# Patient Record
Sex: MALE | Race: WHITE | NOT HISPANIC OR LATINO | URBAN - METROPOLITAN AREA
[De-identification: names, ages, dates, MRNs, and addresses within clinical notes are randomized per-mention and may not be internally consistent; named-entity substitution may affect disease eponyms.]

---

## 2017-08-29 ENCOUNTER — NEW PATIENT (OUTPATIENT)
Dept: URBAN - METROPOLITAN AREA CLINIC 10 | Facility: CLINIC | Age: 62
End: 2017-08-29
Payer: COMMERCIAL

## 2017-08-29 DIAGNOSIS — Z79.84 LONG TERM (CURRENT) USE OF ORAL ANTIDIABETIC DRUGS: ICD-10-CM

## 2017-08-29 DIAGNOSIS — H25.13 AGE-RELATED NUCLEAR CATARACT, BILATERAL: ICD-10-CM

## 2017-08-29 PROCEDURE — 92004 COMPRE OPH EXAM NEW PT 1/>: CPT | Performed by: OPHTHALMOLOGY

## 2017-08-29 PROCEDURE — 92134 CPTRZ OPH DX IMG PST SGM RTA: CPT | Performed by: OPHTHALMOLOGY

## 2017-08-29 PROCEDURE — 92235 FLUORESCEIN ANGRPH MLTIFRAME: CPT | Performed by: OPHTHALMOLOGY

## 2017-08-29 RX ORDER — OFLOXACIN 3 MG/ML
0.3 % SOLUTION/ DROPS OPHTHALMIC
Qty: 1 | Refills: 0 | Status: INACTIVE
Start: 2017-08-29 | End: 2017-09-04

## 2017-08-29 RX ORDER — PREDNISOLONE ACETATE 10 MG/ML
1 % SUSPENSION/ DROPS OPHTHALMIC
Qty: 1 | Refills: 1 | Status: INACTIVE
Start: 2017-08-29 | End: 2018-01-10

## 2017-08-29 ASSESSMENT — INTRAOCULAR PRESSURE
OD: 10
OS: 10

## 2017-09-13 ENCOUNTER — Encounter (OUTPATIENT)
Dept: URBAN - METROPOLITAN AREA CLINIC 10 | Facility: CLINIC | Age: 62
End: 2017-09-13
Payer: COMMERCIAL

## 2017-09-13 PROCEDURE — 99213 OFFICE O/P EST LOW 20 MIN: CPT | Performed by: PHYSICIAN ASSISTANT

## 2017-09-13 RX ORDER — METFORMIN HYDROCHLORIDE 500 MG/1
500 MG TABLET, FILM COATED ORAL
Qty: 0 | Refills: 0 | Status: INACTIVE
Start: 2017-09-13 | End: 2017-12-05

## 2017-09-20 ENCOUNTER — SURGERY (OUTPATIENT)
Dept: URBAN - METROPOLITAN AREA SURGERY 5 | Facility: SURGERY | Age: 62
End: 2017-09-20
Payer: COMMERCIAL

## 2017-09-20 PROCEDURE — 67113 REPAIR RETINAL DETACH CPLX: CPT | Performed by: OPHTHALMOLOGY

## 2017-09-21 ENCOUNTER — POST OP (OUTPATIENT)
Dept: URBAN - METROPOLITAN AREA CLINIC 10 | Facility: CLINIC | Age: 62
End: 2017-09-21

## 2017-09-21 PROCEDURE — 99024 POSTOP FOLLOW-UP VISIT: CPT | Performed by: OPTOMETRIST

## 2017-09-21 ASSESSMENT — INTRAOCULAR PRESSURE: OD: 10

## 2017-10-03 ENCOUNTER — FOLLOW UP ESTABLISHED (OUTPATIENT)
Dept: URBAN - METROPOLITAN AREA CLINIC 10 | Facility: CLINIC | Age: 62
End: 2017-10-03
Payer: COMMERCIAL

## 2017-10-03 DIAGNOSIS — E11.3552 TYPE 2 DIABETES WITH STABLE PROLIF DIABETIC RTNOP, LEFT EYE: Primary | ICD-10-CM

## 2017-10-03 PROCEDURE — 92134 CPTRZ OPH DX IMG PST SGM RTA: CPT | Performed by: OPHTHALMOLOGY

## 2017-10-03 PROCEDURE — 67028 INJECTION EYE DRUG: CPT | Performed by: OPHTHALMOLOGY

## 2017-10-03 PROCEDURE — 99024 POSTOP FOLLOW-UP VISIT: CPT | Performed by: OPHTHALMOLOGY

## 2017-10-03 ASSESSMENT — INTRAOCULAR PRESSURE
OS: 10
OD: 10

## 2017-11-03 ENCOUNTER — FOLLOW UP ESTABLISHED (OUTPATIENT)
Dept: URBAN - METROPOLITAN AREA CLINIC 10 | Facility: CLINIC | Age: 62
End: 2017-11-03
Payer: COMMERCIAL

## 2017-11-03 PROCEDURE — 67028 INJECTION EYE DRUG: CPT | Performed by: OPHTHALMOLOGY

## 2017-11-03 PROCEDURE — 99024 POSTOP FOLLOW-UP VISIT: CPT | Performed by: OPHTHALMOLOGY

## 2017-11-03 PROCEDURE — 92134 CPTRZ OPH DX IMG PST SGM RTA: CPT | Performed by: OPHTHALMOLOGY

## 2017-11-03 ASSESSMENT — INTRAOCULAR PRESSURE
OS: 12
OD: 10

## 2017-12-05 ENCOUNTER — FOLLOW UP ESTABLISHED (OUTPATIENT)
Dept: URBAN - METROPOLITAN AREA CLINIC 10 | Facility: CLINIC | Age: 62
End: 2017-12-05
Payer: COMMERCIAL

## 2017-12-05 PROCEDURE — 92014 COMPRE OPH EXAM EST PT 1/>: CPT | Performed by: OPHTHALMOLOGY

## 2017-12-05 PROCEDURE — 67028 INJECTION EYE DRUG: CPT | Performed by: OPHTHALMOLOGY

## 2017-12-05 PROCEDURE — 92134 CPTRZ OPH DX IMG PST SGM RTA: CPT | Performed by: OPHTHALMOLOGY

## 2017-12-05 RX ORDER — METFORMIN HYDROCHLORIDE 500 MG/1
500 MG TABLET, FILM COATED ORAL
Qty: 0 | Refills: 0 | Status: INACTIVE
Start: 2017-12-05 | End: 2018-01-10

## 2017-12-05 ASSESSMENT — INTRAOCULAR PRESSURE
OS: 8
OD: 8

## 2018-01-02 ENCOUNTER — FOLLOW UP ESTABLISHED (OUTPATIENT)
Dept: URBAN - METROPOLITAN AREA CLINIC 10 | Facility: CLINIC | Age: 63
End: 2018-01-02
Payer: COMMERCIAL

## 2018-01-02 PROCEDURE — 92134 CPTRZ OPH DX IMG PST SGM RTA: CPT | Performed by: OPHTHALMOLOGY

## 2018-01-02 PROCEDURE — 67028 INJECTION EYE DRUG: CPT | Performed by: OPHTHALMOLOGY

## 2018-01-02 ASSESSMENT — INTRAOCULAR PRESSURE
OD: 9
OS: 9

## 2018-01-10 ENCOUNTER — FOLLOW UP ESTABLISHED (OUTPATIENT)
Dept: URBAN - METROPOLITAN AREA CLINIC 10 | Facility: CLINIC | Age: 63
End: 2018-01-10
Payer: COMMERCIAL

## 2018-01-10 PROCEDURE — 92134 CPTRZ OPH DX IMG PST SGM RTA: CPT | Performed by: OPTOMETRIST

## 2018-01-10 PROCEDURE — 92014 COMPRE OPH EXAM EST PT 1/>: CPT | Performed by: OPTOMETRIST

## 2018-01-10 ASSESSMENT — VISUAL ACUITY
OS: 20/40
OD: 20/60

## 2018-01-10 ASSESSMENT — INTRAOCULAR PRESSURE
OD: 8
OS: 9

## 2018-01-10 ASSESSMENT — KERATOMETRY
OD: 46.00
OS: 46.38

## 2018-01-22 ENCOUNTER — FOLLOW UP ESTABLISHED (OUTPATIENT)
Dept: URBAN - METROPOLITAN AREA CLINIC 10 | Facility: CLINIC | Age: 63
End: 2018-01-22
Payer: COMMERCIAL

## 2018-01-22 PROCEDURE — 92012 INTRM OPH EXAM EST PATIENT: CPT | Performed by: OPTOMETRIST

## 2018-01-22 ASSESSMENT — INTRAOCULAR PRESSURE
OD: 11
OS: 10

## 2018-01-23 ENCOUNTER — Encounter (OUTPATIENT)
Dept: URBAN - METROPOLITAN AREA CLINIC 10 | Facility: CLINIC | Age: 63
End: 2018-01-23
Payer: COMMERCIAL

## 2018-01-23 DIAGNOSIS — H25.813 COMBINED FORMS OF AGE-RELATED CATARACT, BILATERAL: Primary | ICD-10-CM

## 2018-01-23 PROCEDURE — 92025 CPTRIZED CORNEAL TOPOGRAPHY: CPT | Performed by: OPHTHALMOLOGY

## 2018-01-23 PROCEDURE — 99213 OFFICE O/P EST LOW 20 MIN: CPT | Performed by: PHYSICIAN ASSISTANT

## 2018-01-25 ENCOUNTER — FOLLOW UP ESTABLISHED (OUTPATIENT)
Dept: URBAN - METROPOLITAN AREA CLINIC 10 | Facility: CLINIC | Age: 63
End: 2018-01-25
Payer: COMMERCIAL

## 2018-01-25 DIAGNOSIS — H25.12 AGE-RELATED NUCLEAR CATARACT, LEFT EYE: ICD-10-CM

## 2018-01-25 DIAGNOSIS — H53.2 DIPLOPIA: ICD-10-CM

## 2018-01-25 DIAGNOSIS — H25.11 AGE-RELATED NUCLEAR CATARACT, RIGHT EYE: Primary | ICD-10-CM

## 2018-01-25 PROCEDURE — 92012 INTRM OPH EXAM EST PATIENT: CPT | Performed by: OPHTHALMOLOGY

## 2018-01-25 RX ORDER — PREDNISOLONE ACETATE 10 MG/ML
1 % SUSPENSION/ DROPS OPHTHALMIC
Qty: 1 | Refills: 1 | Status: INACTIVE
Start: 2018-01-25 | End: 2018-03-07

## 2018-01-25 RX ORDER — DICLOFENAC SODIUM 1 MG/ML
0.1 % SOLUTION/ DROPS OPHTHALMIC
Qty: 1 | Refills: 1 | Status: INACTIVE
Start: 2018-01-25 | End: 2018-03-07

## 2018-01-25 ASSESSMENT — INTRAOCULAR PRESSURE
OD: 12
OS: 12

## 2018-01-30 ENCOUNTER — SURGERY (OUTPATIENT)
Dept: URBAN - METROPOLITAN AREA SURGERY 5 | Facility: SURGERY | Age: 63
End: 2018-01-30
Payer: COMMERCIAL

## 2018-01-30 PROCEDURE — 66984 XCAPSL CTRC RMVL W/O ECP: CPT | Performed by: OPHTHALMOLOGY

## 2018-01-31 ENCOUNTER — POST OP (OUTPATIENT)
Dept: URBAN - METROPOLITAN AREA CLINIC 10 | Facility: CLINIC | Age: 63
End: 2018-01-31

## 2018-01-31 DIAGNOSIS — Z96.1 PRESENCE OF INTRAOCULAR LENS: Primary | ICD-10-CM

## 2018-01-31 PROCEDURE — 99024 POSTOP FOLLOW-UP VISIT: CPT | Performed by: OPTOMETRIST

## 2018-01-31 ASSESSMENT — INTRAOCULAR PRESSURE: OD: 18

## 2018-02-06 ENCOUNTER — POST OP (OUTPATIENT)
Dept: URBAN - METROPOLITAN AREA CLINIC 10 | Facility: CLINIC | Age: 63
End: 2018-02-06

## 2018-02-06 PROCEDURE — 99024 POSTOP FOLLOW-UP VISIT: CPT | Performed by: OPTOMETRIST

## 2018-02-06 ASSESSMENT — VISUAL ACUITY
OD: 20/50
OS: 20/40

## 2018-02-06 ASSESSMENT — INTRAOCULAR PRESSURE: OD: 10

## 2018-02-09 ENCOUNTER — FOLLOW UP ESTABLISHED (OUTPATIENT)
Dept: URBAN - METROPOLITAN AREA CLINIC 10 | Facility: CLINIC | Age: 63
End: 2018-02-09
Payer: COMMERCIAL

## 2018-02-09 PROCEDURE — 67028 INJECTION EYE DRUG: CPT | Performed by: OPHTHALMOLOGY

## 2018-02-09 PROCEDURE — 92235 FLUORESCEIN ANGRPH MLTIFRAME: CPT | Performed by: OPHTHALMOLOGY

## 2018-02-09 PROCEDURE — 92134 CPTRZ OPH DX IMG PST SGM RTA: CPT | Performed by: OPHTHALMOLOGY

## 2018-02-09 ASSESSMENT — INTRAOCULAR PRESSURE
OS: 8
OD: 12

## 2018-02-13 ENCOUNTER — SURGERY (OUTPATIENT)
Dept: URBAN - METROPOLITAN AREA SURGERY 5 | Facility: SURGERY | Age: 63
End: 2018-02-13
Payer: COMMERCIAL

## 2018-02-13 PROCEDURE — 66984 XCAPSL CTRC RMVL W/O ECP: CPT | Performed by: OPHTHALMOLOGY

## 2018-02-14 ENCOUNTER — POST OP (OUTPATIENT)
Dept: URBAN - METROPOLITAN AREA CLINIC 10 | Facility: CLINIC | Age: 63
End: 2018-02-14

## 2018-02-14 PROCEDURE — 99024 POSTOP FOLLOW-UP VISIT: CPT | Performed by: OPTOMETRIST

## 2018-02-14 ASSESSMENT — INTRAOCULAR PRESSURE: OS: 8

## 2018-03-07 ENCOUNTER — POST OP (OUTPATIENT)
Dept: URBAN - METROPOLITAN AREA CLINIC 10 | Facility: CLINIC | Age: 63
End: 2018-03-07

## 2018-03-07 PROCEDURE — 92015 DETERMINE REFRACTIVE STATE: CPT | Performed by: OPTOMETRIST

## 2018-03-07 PROCEDURE — 99024 POSTOP FOLLOW-UP VISIT: CPT | Performed by: OPTOMETRIST

## 2018-03-07 ASSESSMENT — INTRAOCULAR PRESSURE
OS: 12
OD: 12

## 2018-03-07 ASSESSMENT — VISUAL ACUITY
OD: 20/40
OS: 20/20

## 2018-03-23 ENCOUNTER — FOLLOW UP ESTABLISHED (OUTPATIENT)
Dept: URBAN - METROPOLITAN AREA CLINIC 10 | Facility: CLINIC | Age: 63
End: 2018-03-23
Payer: COMMERCIAL

## 2018-03-23 PROCEDURE — 92134 CPTRZ OPH DX IMG PST SGM RTA: CPT | Performed by: OPHTHALMOLOGY

## 2018-03-23 PROCEDURE — 67028 INJECTION EYE DRUG: CPT | Performed by: OPHTHALMOLOGY

## 2018-03-23 ASSESSMENT — INTRAOCULAR PRESSURE
OD: 11
OS: 7

## 2018-05-15 ENCOUNTER — FOLLOW UP ESTABLISHED (OUTPATIENT)
Dept: URBAN - METROPOLITAN AREA CLINIC 10 | Facility: CLINIC | Age: 63
End: 2018-05-15
Payer: COMMERCIAL

## 2018-05-15 PROCEDURE — 92134 CPTRZ OPH DX IMG PST SGM RTA: CPT | Performed by: OPHTHALMOLOGY

## 2018-05-15 PROCEDURE — 92014 COMPRE OPH EXAM EST PT 1/>: CPT | Performed by: OPHTHALMOLOGY

## 2018-05-15 ASSESSMENT — INTRAOCULAR PRESSURE
OD: 10
OS: 11

## 2018-11-06 ENCOUNTER — FOLLOW UP ESTABLISHED (OUTPATIENT)
Dept: URBAN - METROPOLITAN AREA CLINIC 10 | Facility: CLINIC | Age: 63
End: 2018-11-06
Payer: COMMERCIAL

## 2018-11-06 PROCEDURE — 92134 CPTRZ OPH DX IMG PST SGM RTA: CPT | Performed by: OPHTHALMOLOGY

## 2018-11-06 PROCEDURE — 92014 COMPRE OPH EXAM EST PT 1/>: CPT | Performed by: OPHTHALMOLOGY

## 2018-11-06 ASSESSMENT — INTRAOCULAR PRESSURE
OS: 10
OD: 10

## 2018-11-26 ENCOUNTER — FOLLOW UP ESTABLISHED (OUTPATIENT)
Dept: URBAN - METROPOLITAN AREA CLINIC 10 | Facility: CLINIC | Age: 63
End: 2018-11-26
Payer: COMMERCIAL

## 2018-11-26 DIAGNOSIS — E11.3531 TYPE 2 DIAB W PROLIF DIAB RTNOP W TRCTN DTCH N-MCLA, R EYE: Primary | ICD-10-CM

## 2018-11-26 PROCEDURE — 92012 INTRM OPH EXAM EST PATIENT: CPT | Performed by: OPTOMETRIST

## 2018-11-26 ASSESSMENT — INTRAOCULAR PRESSURE
OD: 12
OD: 13
OS: 12

## 2018-12-04 ENCOUNTER — FOLLOW UP ESTABLISHED (OUTPATIENT)
Dept: URBAN - METROPOLITAN AREA CLINIC 10 | Facility: CLINIC | Age: 63
End: 2018-12-04
Payer: COMMERCIAL

## 2018-12-04 PROCEDURE — 92134 CPTRZ OPH DX IMG PST SGM RTA: CPT | Performed by: OPHTHALMOLOGY

## 2018-12-04 PROCEDURE — 67028 INJECTION EYE DRUG: CPT | Performed by: OPHTHALMOLOGY

## 2018-12-04 ASSESSMENT — INTRAOCULAR PRESSURE
OD: 9
OS: 6

## 2019-01-22 ENCOUNTER — FOLLOW UP ESTABLISHED (OUTPATIENT)
Dept: URBAN - METROPOLITAN AREA CLINIC 10 | Facility: CLINIC | Age: 64
End: 2019-01-22
Payer: COMMERCIAL

## 2019-01-22 PROCEDURE — 92134 CPTRZ OPH DX IMG PST SGM RTA: CPT | Performed by: OPHTHALMOLOGY

## 2019-01-22 PROCEDURE — 67028 INJECTION EYE DRUG: CPT | Performed by: OPHTHALMOLOGY

## 2019-01-22 PROCEDURE — 92014 COMPRE OPH EXAM EST PT 1/>: CPT | Performed by: OPHTHALMOLOGY

## 2019-01-22 PROCEDURE — 65800 DRAINAGE OF EYE: CPT | Performed by: OPHTHALMOLOGY

## 2019-01-22 RX ORDER — OFLOXACIN 3 MG/ML
0.3 % SOLUTION/ DROPS OPHTHALMIC
Qty: 1 | Refills: 0 | Status: INACTIVE
Start: 2019-01-22 | End: 2019-02-19

## 2019-01-22 RX ORDER — PREDNISOLONE ACETATE 10 MG/ML
1 % SUSPENSION/ DROPS OPHTHALMIC
Qty: 1 | Refills: 1 | Status: INACTIVE
Start: 2019-01-22 | End: 2019-04-23

## 2019-01-22 ASSESSMENT — INTRAOCULAR PRESSURE
OS: 13
OD: 12

## 2019-01-25 ENCOUNTER — Encounter (OUTPATIENT)
Dept: URBAN - METROPOLITAN AREA CLINIC 10 | Facility: CLINIC | Age: 64
End: 2019-01-25
Payer: COMMERCIAL

## 2019-01-25 DIAGNOSIS — Z01.818 ENCOUNTER FOR OTHER PREPROCEDURAL EXAMINATION: Primary | ICD-10-CM

## 2019-01-25 PROCEDURE — 99213 OFFICE O/P EST LOW 20 MIN: CPT | Performed by: PHYSICIAN ASSISTANT

## 2019-02-08 ENCOUNTER — SURGERY (OUTPATIENT)
Dept: URBAN - METROPOLITAN AREA SURGERY 5 | Facility: SURGERY | Age: 64
End: 2019-02-08
Payer: COMMERCIAL

## 2019-02-08 PROCEDURE — 67042 VIT FOR MACULAR HOLE: CPT | Performed by: OPHTHALMOLOGY

## 2019-02-08 PROCEDURE — 66820 INCISION SECONDARY CATARACT: CPT | Performed by: OPHTHALMOLOGY

## 2019-02-09 ENCOUNTER — POST OP (OUTPATIENT)
Dept: URBAN - METROPOLITAN AREA CLINIC 10 | Facility: CLINIC | Age: 64
End: 2019-02-09

## 2019-02-09 DIAGNOSIS — E11.3532 TYPE 2 DIAB W PROLIF DIAB RTNOP W TRCTN DTCH N-MCLA, L EYE: Primary | ICD-10-CM

## 2019-02-09 PROCEDURE — 99024 POSTOP FOLLOW-UP VISIT: CPT | Performed by: OPTOMETRIST

## 2019-02-09 ASSESSMENT — INTRAOCULAR PRESSURE: OS: 10

## 2019-02-19 ENCOUNTER — FOLLOW UP ESTABLISHED (OUTPATIENT)
Dept: URBAN - METROPOLITAN AREA CLINIC 10 | Facility: CLINIC | Age: 64
End: 2019-02-19
Payer: COMMERCIAL

## 2019-02-19 PROCEDURE — 99024 POSTOP FOLLOW-UP VISIT: CPT | Performed by: OPHTHALMOLOGY

## 2019-02-19 PROCEDURE — 92134 CPTRZ OPH DX IMG PST SGM RTA: CPT | Performed by: OPHTHALMOLOGY

## 2019-02-19 ASSESSMENT — INTRAOCULAR PRESSURE
OS: 10
OD: 10

## 2019-03-12 ENCOUNTER — FOLLOW UP ESTABLISHED (OUTPATIENT)
Dept: URBAN - METROPOLITAN AREA CLINIC 10 | Facility: CLINIC | Age: 64
End: 2019-03-12
Payer: COMMERCIAL

## 2019-03-12 PROCEDURE — 92134 CPTRZ OPH DX IMG PST SGM RTA: CPT | Performed by: OPHTHALMOLOGY

## 2019-03-12 ASSESSMENT — INTRAOCULAR PRESSURE
OS: 12
OD: 9

## 2019-04-23 ENCOUNTER — FOLLOW UP ESTABLISHED (OUTPATIENT)
Dept: URBAN - METROPOLITAN AREA CLINIC 10 | Facility: CLINIC | Age: 64
End: 2019-04-23
Payer: COMMERCIAL

## 2019-04-23 DIAGNOSIS — E11.3533 TYPE 2 DIAB W PROLIF DIAB RTNOP WITH TRCTN DTCH N-MCLA, BI: Primary | ICD-10-CM

## 2019-04-23 PROCEDURE — 92134 CPTRZ OPH DX IMG PST SGM RTA: CPT | Performed by: OPHTHALMOLOGY

## 2019-04-23 ASSESSMENT — INTRAOCULAR PRESSURE
OD: 12
OS: 14

## 2019-05-24 ENCOUNTER — RX CHECK (OUTPATIENT)
Dept: URBAN - METROPOLITAN AREA CLINIC 10 | Facility: CLINIC | Age: 64
End: 2019-05-24

## 2019-05-24 DIAGNOSIS — H50.111 MONOCULAR EXOTROPIA, RIGHT EYE: Primary | ICD-10-CM

## 2019-05-24 PROCEDURE — 92015 DETERMINE REFRACTIVE STATE: CPT | Performed by: OPTOMETRIST

## 2019-05-24 ASSESSMENT — VISUAL ACUITY
OS: 20/30
OD: 20/30

## 2019-05-24 ASSESSMENT — KERATOMETRY
OS: 46.50
OD: 46.13

## 2019-06-11 ENCOUNTER — FOLLOW UP ESTABLISHED (OUTPATIENT)
Dept: URBAN - METROPOLITAN AREA CLINIC 10 | Facility: CLINIC | Age: 64
End: 2019-06-11
Payer: COMMERCIAL

## 2019-06-11 PROCEDURE — 92235 FLUORESCEIN ANGRPH MLTIFRAME: CPT | Performed by: OPHTHALMOLOGY

## 2019-06-11 PROCEDURE — 92134 CPTRZ OPH DX IMG PST SGM RTA: CPT | Performed by: OPHTHALMOLOGY

## 2019-06-11 ASSESSMENT — INTRAOCULAR PRESSURE
OD: 9
OS: 9

## 2020-04-08 ENCOUNTER — FOLLOW UP ESTABLISHED (OUTPATIENT)
Dept: URBAN - METROPOLITAN AREA CLINIC 10 | Facility: CLINIC | Age: 65
End: 2020-04-08
Payer: COMMERCIAL

## 2020-04-08 DIAGNOSIS — E11.3313 DIABETES MELLITUS TYPE 2 WITH MODERATE NON-PROLIFE: Primary | ICD-10-CM

## 2020-04-08 DIAGNOSIS — H26.491 OTHER SECONDARY CATARACT, RIGHT EYE: ICD-10-CM

## 2020-04-08 PROCEDURE — 92134 CPTRZ OPH DX IMG PST SGM RTA: CPT | Performed by: OPTOMETRIST

## 2020-04-08 PROCEDURE — 92014 COMPRE OPH EXAM EST PT 1/>: CPT | Performed by: OPTOMETRIST

## 2020-04-08 ASSESSMENT — INTRAOCULAR PRESSURE
OD: 10
OS: 9

## 2020-04-08 ASSESSMENT — VISUAL ACUITY
OS: 20/30
OD: 20/70

## 2020-04-08 ASSESSMENT — KERATOMETRY
OS: 46.63
OD: 46.38

## 2020-04-14 ENCOUNTER — FOLLOW UP ESTABLISHED (OUTPATIENT)
Dept: URBAN - METROPOLITAN AREA CLINIC 10 | Facility: CLINIC | Age: 65
End: 2020-04-14
Payer: COMMERCIAL

## 2020-04-14 PROCEDURE — 65800 DRAINAGE OF EYE: CPT | Performed by: OPHTHALMOLOGY

## 2020-04-14 PROCEDURE — 92134 CPTRZ OPH DX IMG PST SGM RTA: CPT | Performed by: OPHTHALMOLOGY

## 2020-04-14 PROCEDURE — 92014 COMPRE OPH EXAM EST PT 1/>: CPT | Performed by: OPHTHALMOLOGY

## 2020-04-14 ASSESSMENT — INTRAOCULAR PRESSURE
OD: 10
OD: 11
OS: 11
OS: 10

## 2020-05-20 ENCOUNTER — FOLLOW UP ESTABLISHED (OUTPATIENT)
Dept: URBAN - METROPOLITAN AREA CLINIC 10 | Facility: CLINIC | Age: 65
End: 2020-05-20
Payer: COMMERCIAL

## 2020-05-20 PROCEDURE — 99213 OFFICE O/P EST LOW 20 MIN: CPT | Performed by: OPTOMETRIST

## 2020-05-20 ASSESSMENT — INTRAOCULAR PRESSURE
OS: 15
OD: 13

## 2020-08-04 ENCOUNTER — FOLLOW UP ESTABLISHED (OUTPATIENT)
Dept: URBAN - METROPOLITAN AREA CLINIC 10 | Facility: CLINIC | Age: 65
End: 2020-08-04
Payer: COMMERCIAL

## 2020-08-04 DIAGNOSIS — Z79.4 LONG TERM (CURRENT) USE OF INSULIN: ICD-10-CM

## 2020-08-04 DIAGNOSIS — H40.013 OPEN ANGLE WITH BORDERLINE FINDINGS, LOW RISK, BILATERAL: ICD-10-CM

## 2020-08-04 PROCEDURE — 65800 DRAINAGE OF EYE: CPT | Performed by: OPHTHALMOLOGY

## 2020-08-04 PROCEDURE — 92134 CPTRZ OPH DX IMG PST SGM RTA: CPT | Performed by: OPHTHALMOLOGY

## 2020-08-04 ASSESSMENT — INTRAOCULAR PRESSURE
OS: 10
OS: 9
OD: 11
OD: 9

## 2020-09-29 ENCOUNTER — FOLLOW UP ESTABLISHED (OUTPATIENT)
Dept: URBAN - METROPOLITAN AREA CLINIC 10 | Facility: CLINIC | Age: 65
End: 2020-09-29
Payer: COMMERCIAL

## 2020-09-29 PROCEDURE — 65800 DRAINAGE OF EYE: CPT | Performed by: OPHTHALMOLOGY

## 2020-09-29 PROCEDURE — 67028 INJECTION EYE DRUG: CPT | Performed by: OPHTHALMOLOGY

## 2020-09-29 PROCEDURE — 92134 CPTRZ OPH DX IMG PST SGM RTA: CPT | Performed by: OPHTHALMOLOGY

## 2020-09-29 PROCEDURE — 92014 COMPRE OPH EXAM EST PT 1/>: CPT | Performed by: OPHTHALMOLOGY

## 2020-09-29 ASSESSMENT — INTRAOCULAR PRESSURE
OD: 7
OS: 8
OD: 8
OS: 6

## 2020-10-27 ENCOUNTER — FOLLOW UP ESTABLISHED (OUTPATIENT)
Dept: URBAN - METROPOLITAN AREA CLINIC 10 | Facility: CLINIC | Age: 65
End: 2020-10-27
Payer: COMMERCIAL

## 2020-10-27 PROCEDURE — 99213 OFFICE O/P EST LOW 20 MIN: CPT | Performed by: OPTOMETRIST

## 2020-10-27 ASSESSMENT — INTRAOCULAR PRESSURE
OS: 11
OD: 10

## 2020-12-01 ENCOUNTER — FOLLOW UP ESTABLISHED (OUTPATIENT)
Dept: URBAN - METROPOLITAN AREA CLINIC 10 | Facility: CLINIC | Age: 65
End: 2020-12-01
Payer: COMMERCIAL

## 2020-12-01 PROCEDURE — 67028 INJECTION EYE DRUG: CPT | Performed by: OPHTHALMOLOGY

## 2020-12-01 PROCEDURE — 92134 CPTRZ OPH DX IMG PST SGM RTA: CPT | Performed by: OPHTHALMOLOGY

## 2020-12-01 PROCEDURE — 65800 DRAINAGE OF EYE: CPT | Performed by: OPHTHALMOLOGY

## 2020-12-01 RX ORDER — OFLOXACIN 3 MG/ML
0.3 % SOLUTION/ DROPS OPHTHALMIC
Qty: 1 | Refills: 1 | Status: INACTIVE
Start: 2020-12-01 | End: 2021-06-15

## 2020-12-01 ASSESSMENT — INTRAOCULAR PRESSURE
OS: 14
OD: 10
OS: 10

## 2021-04-02 ENCOUNTER — OFFICE VISIT (OUTPATIENT)
Dept: URBAN - METROPOLITAN AREA CLINIC 10 | Facility: CLINIC | Age: 66
End: 2021-04-02
Payer: COMMERCIAL

## 2021-04-02 PROCEDURE — 92134 CPTRZ OPH DX IMG PST SGM RTA: CPT | Performed by: OPHTHALMOLOGY

## 2021-04-02 PROCEDURE — 99214 OFFICE O/P EST MOD 30 MIN: CPT | Performed by: OPHTHALMOLOGY

## 2021-04-02 ASSESSMENT — INTRAOCULAR PRESSURE
OS: 11
OD: 10

## 2021-04-02 NOTE — IMPRESSION/PLAN
Impression: NIDDM w PDR - Rslv'd TRD s/p VIT / peel Sep '17 OD-- 20/500-->    Vit peel OS '19 OZURDEX trial '20 to EXTEND Plan: Hx:[[OD PRP '17 -VIT/TRD Rpr Sep '17 - VIT TRD Rpr OS Feb '19. Imprv'd TRD gone. LOST f/u 9m ('19-Apr '20) - RECUR IF OFF Steroid AMA]]. TODAY, DME/CME CHRONIC reduced (RESUMED care Ozdx OD Sep OS Dec '20) Few cysts ebb/flow OD. RTC RETINA 12w colors / OPTOS / OCT -- PRN add EyLea on top of Ozurdex [place] MRx update w Prism PRN.   Extend injx w Ozrdx

## 2021-06-15 ENCOUNTER — OFFICE VISIT (OUTPATIENT)
Dept: URBAN - METROPOLITAN AREA CLINIC 10 | Facility: CLINIC | Age: 66
End: 2021-06-15
Payer: COMMERCIAL

## 2021-06-15 PROCEDURE — 65800 DRAINAGE OF EYE: CPT | Performed by: OPHTHALMOLOGY

## 2021-06-15 PROCEDURE — 92134 CPTRZ OPH DX IMG PST SGM RTA: CPT | Performed by: OPHTHALMOLOGY

## 2021-06-15 ASSESSMENT — INTRAOCULAR PRESSURE
OD: 10
OS: 10
OS: 9
OD: 9

## 2021-06-15 NOTE — IMPRESSION/PLAN
Impression: NIDDM w PDR - Rslv'd TRD s/p VIT / peel Sep '17 OD-- 20/500-->    Vit peel OS '19 OZURDEX trial '20 to EXTEND Plan: Hx:[[OD PRP '17 -VIT/TRD Rpr Sep '17 - VIT TRD Rpr OS Feb '19. Imprv'd TRD gone. LOST f/u 9m ('19-Apr '20) - RECUR IF OFF Steroid AMA - rare CME ebb/flow -- LAST OZRDX OD: Sep '20   OS: Dec '20]]. TODAY, DME/CME CHRONIC RECUR -- TODAY EyLea + IVTA OU w TAP 10 (proc note) RTC IOP 4w. RETINA 10w Plan Ozurdex OD / [facility] OU - Future Ozrdx OS / ND? NEXT time after plan Ozurdex OS . Nait Khouryence [facility] eye care MRx update w Prism PRN.

## 2021-06-15 NOTE — IMPRESSION/PLAN
Impression: Use of insulin Plan: Cont IDDM care. URGED better BG / BP    A1c 6.9%. GOOD BG now. DME is result of prior years of DM . .. NOT poor care now.

## 2021-06-17 ENCOUNTER — OFFICE VISIT (OUTPATIENT)
Dept: URBAN - METROPOLITAN AREA CLINIC 44 | Facility: CLINIC | Age: 66
End: 2021-06-17
Payer: COMMERCIAL

## 2021-06-17 ENCOUNTER — OFFICE VISIT (OUTPATIENT)
Dept: URBAN - METROPOLITAN AREA CLINIC 10 | Facility: CLINIC | Age: 66
End: 2021-06-17
Payer: COMMERCIAL

## 2021-06-17 PROCEDURE — 99214 OFFICE O/P EST MOD 30 MIN: CPT | Performed by: OPHTHALMOLOGY

## 2021-06-17 PROCEDURE — 99215 OFFICE O/P EST HI 40 MIN: CPT | Performed by: OPTOMETRIST

## 2021-06-17 ASSESSMENT — INTRAOCULAR PRESSURE
OS: 11
OS: 11
OD: 10
OD: 10

## 2021-06-17 NOTE — IMPRESSION/PLAN
Impression: NIDDM w PDR - Rslv'd TRD s/p VIT / peel Sep '17 OD-- 20/500-->    Vit peel OS '19 OZURDEX trial '20 to EXTEND Plan: 2 days after Eylea. Significant inflammatory reaction posterior seg, ant seg, 1+ hypopion OU. No injection. No pain. Inflammatory rxn vs endophthalmitis. Concern is for endophthalmitis OU. See retina today.

## 2021-06-17 NOTE — IMPRESSION/PLAN
Impression: Purulent Endophthalmitis, OU. Plan: Injected ceftazidime and vancomycin for both eyes, no pain. Rapid loss vision by 24 hours, see DR BENSON tomorrow.

## 2021-06-18 ENCOUNTER — OFFICE VISIT (OUTPATIENT)
Dept: URBAN - METROPOLITAN AREA CLINIC 10 | Facility: CLINIC | Age: 66
End: 2021-06-18
Payer: COMMERCIAL

## 2021-06-18 PROCEDURE — 92134 CPTRZ OPH DX IMG PST SGM RTA: CPT | Performed by: OPHTHALMOLOGY

## 2021-06-18 PROCEDURE — 99214 OFFICE O/P EST MOD 30 MIN: CPT | Performed by: OPHTHALMOLOGY

## 2021-06-18 ASSESSMENT — INTRAOCULAR PRESSURE
OD: 7
OD: 10
OS: 10
OS: 7

## 2021-06-18 NOTE — IMPRESSION/PLAN
Impression: Purulent Endophthalmitis, OU. NEVER USE EYLEA again - inflamm? Plan: Injected ceftazidime and vancomycin OU w Dr. Jimy Layne. IMPROVING today. NO FIBRIN AC. NO pain. NOT injected / red. Likely INFLAMMATORY. LONG d/w pt and family (incl about Joyce Hester / Maile Arshad) However, as precaution today add BOTH VANCO / IVTA OU w VIT tap to 10 (proc note) RECHECK VA / comfort in 24h. RETINA Jocelyn 72h. Expect improve -No RD w John Paul Sky  URGED TIGHT CONTROL OF DM (the primary issue)    NEVER USE EYLEA again

## 2021-06-18 NOTE — IMPRESSION/PLAN
Impression: NIDDM w PDR - Rslv'd TRD s/p VIT / peel Sep '17 OD-- 20/500-->    Vit peel OS '19 OZURDEX trial '20 to EXTEND Plan: Hx:[[OD PRP '17 -VIT/TRD Rpr Sep '17 - VIT TRD Rpr OS Feb '19. Imprv'd TRD gone. LOST f/u 9m ('19-Apr '20) - RECUR IF OFF Steroid AMA - rare CME ebb/flow -- LAST OZRDX OD: Sep '20   OS: Dec '20 -- NEVER EYLEA -- INFLAMMTION]]. DME/CME CHRONIC RECUR --   ONCE HEALED   Future Ozrdx OS / ND? NEXT time after plan Ozurdex OS . Reji Al eye care MRx update w Prism PRN. 
  PREFER OZURDEX to IVTA in POST VIT EYE

## 2021-06-19 ENCOUNTER — OFFICE VISIT (OUTPATIENT)
Dept: URBAN - METROPOLITAN AREA CLINIC 10 | Facility: CLINIC | Age: 66
End: 2021-06-19
Payer: COMMERCIAL

## 2021-06-19 PROCEDURE — 99213 OFFICE O/P EST LOW 20 MIN: CPT | Performed by: OPTOMETRIST

## 2021-06-19 ASSESSMENT — INTRAOCULAR PRESSURE
OS: 9
OD: 9

## 2021-06-19 NOTE — IMPRESSION/PLAN
Impression: Purulent Endophthalmitis, OU. NEVER USE EYLEA again - inflamm? Plan: Significantly improved today. IOP remains normotensive. S/p vanco 1 day and vanco/ceftazidime 2 days. Inflammation after Eylea 4 days ago. Improved in both symptoms and signs. Vision improving. Reassurance. F/u as scheduled c Dr. ZHONG [facility].

## 2021-06-21 ENCOUNTER — OFFICE VISIT (OUTPATIENT)
Dept: URBAN - METROPOLITAN AREA CLINIC 24 | Facility: CLINIC | Age: 66
End: 2021-06-21
Payer: COMMERCIAL

## 2021-06-21 DIAGNOSIS — H44.003 UNSPECIFIED PURULENT ENDOPHTHALMITIS, BILATERAL: ICD-10-CM

## 2021-06-21 PROCEDURE — 99214 OFFICE O/P EST MOD 30 MIN: CPT | Performed by: OPHTHALMOLOGY

## 2021-06-21 PROCEDURE — 92134 CPTRZ OPH DX IMG PST SGM RTA: CPT | Performed by: OPHTHALMOLOGY

## 2021-06-21 ASSESSMENT — INTRAOCULAR PRESSURE
OD: 13
OS: 8

## 2021-06-21 NOTE — IMPRESSION/PLAN
Impression: NIDDM w PDR - Rslv'd TRD s/p VIT / peel Sep '17 OD-- 20/500-->    Vit peel OS '19 OZURDEX trial '20 to EXTEND Plan: Hx:[[OD PRP '17 -VIT/TRD Rpr Sep '17 - VIT TRD Rpr OS Feb '19. Imprv'd TRD gone. LOST f/u 9m ('19-Apr '20) - RECUR IF OFF Steroid AMA - rare CME ebb/flow -- LAST OZRDX OD: Sep '20   OS: Dec '20 -- NEVER EYLEA -- INFLAMMTION]]. DME/CME CHRONIC RECUR -- NOW improved. Inflm resolving - NOT true infx RTC Lui Qualia check Thu or Fri. RTC RETINA KELLEY 3-4w plan Ozrdx OS / ND/inj/OCT --
THE FOLLOWING TIME plan Ozurdex OD . Etienne Awkward [facility] eye care MRx update w Prism PRN. 
  PREFER OZURDEX (rather than IVTA in POST VIT EYE) Prefer Avastin (NEVER Eylea)  MAY NEED OZURDEX EVERY 4-5mo OU

## 2021-06-21 NOTE — IMPRESSION/PLAN
Impression: Purulent Endophthalmitis, OU. NEVER USE EYLEA again - inflamm? Plan: Significantly improved today. IOP remains normotensive. Inflammation after Eylea 6 days ago. Vision improving. Reassurance. NO e/o infx. NEVER ANY MORE EYLEA Prefer OZURDEX future to IVTA. RESOLVED.    See other plan

## 2021-06-24 ENCOUNTER — OFFICE VISIT (OUTPATIENT)
Dept: URBAN - METROPOLITAN AREA CLINIC 10 | Facility: CLINIC | Age: 66
End: 2021-06-24
Payer: COMMERCIAL

## 2021-06-24 PROCEDURE — 99213 OFFICE O/P EST LOW 20 MIN: CPT | Performed by: OPTOMETRIST

## 2021-06-24 ASSESSMENT — INTRAOCULAR PRESSURE
OS: 15
OD: 14

## 2021-06-24 NOTE — IMPRESSION/PLAN
Impression: NIDDM w PDR - Rslv'd TRD s/p VIT / peel Sep '17 OD-- 20/500-->    Vit peel OS '19 OZURDEX trial '20 to EXTEND Plan: Hx:[[OD PRP '17 -VIT/TRD Rpr Sep '17 - VIT TRD Rpr OS Feb '19. Imprv'd TRD gone. LOST f/u 9m ('19-Apr '20) - RECUR IF OFF Steroid AMA - rare CME ebb/flow -- LAST OZRDX OD: Sep '20   OS: Dec '20 -- NEVER EYLEA -- INFLAMMTION]]. DME/CME CHRONIC RECUR -- NOW improved. Inflm resolving - NOT true infx RTC RETINA KELLEY 3-4w plan Ozrdx OS / ND/inj/OCT --
THE FOLLOWING TIME plan Ozurdex OD . Alexis Wilks [facility] eye care MRx update w Prism PRN. PREFER OZURDEX (rather than IVTA in POST VIT EYE) Prefer Avastin (NEVER Eylea)  MAY NEED OZURDEX EVERY 4-5mo OU Pt reports some photophobia and decrease in visual acuity. Reassurance. Expect to continue to improve.

## 2021-06-24 NOTE — IMPRESSION/PLAN
Impression: Purulent Endophthalmitis, OU. NEVER USE EYLEA again - inflamm? Plan: Continues to improve. IOP remains normotensive. Inflammation after Eylea 6 days ago. Vision improving. Reassurance. NO e/o infx. NEVER ANY MORE EYLEA Prefer OZURDEX future to IVTA. RESOLVED.    See other plan

## 2021-08-31 ENCOUNTER — OFFICE VISIT (OUTPATIENT)
Dept: URBAN - METROPOLITAN AREA CLINIC 10 | Facility: CLINIC | Age: 66
End: 2021-08-31
Payer: COMMERCIAL

## 2021-08-31 DIAGNOSIS — E11.3513 TYPE 2 DIABETES MELLITUS WITH PROLIFERATIVE DIABETIC RETINOPATHY WITH MACULAR EDEMA, BILATERAL: ICD-10-CM

## 2021-08-31 PROCEDURE — 67028 INJECTION EYE DRUG: CPT | Performed by: OPHTHALMOLOGY

## 2021-08-31 PROCEDURE — 92134 CPTRZ OPH DX IMG PST SGM RTA: CPT | Performed by: OPHTHALMOLOGY

## 2021-08-31 ASSESSMENT — INTRAOCULAR PRESSURE
OS: 10
OD: 12

## 2021-08-31 NOTE — IMPRESSION/PLAN
Impression: NIDDM w PDR -Rslv'd TRD s/p VIT/ peel '17 OD - 20/500--> Vit peel OS '19 - OZURDEX trial '20 to EXTEND Plan: Hx:[[OD PRP '17 -VIT/TRD Rpr Sep '17 - VIT TRD Rpr OS Feb '19. Imprv'd TRD gone. LOST f/u 9m ('19-Apr '20) - RECUR IF OFF Steroid AMA - rare CME ebb/flow -- LAST OZRDX OD: Sep '20   OS: Dec '20 -- NEVER EYLEA -- INFLAMMTION]]. DME/CME CHRONIC RECUR -- NOW improved. Inflm resolving - NOT true infx RTC César Maxwell IOP check and manage gen eye / Gtts 3wk. RTC RETINA KELLEY 10w exam only . . .  Likely FUTURE (4m) Ozrdx OS -
THE FOLLOWING TIME plan Ozurdex OD . Danna Keto Gen eye care MRx update w Prism PRN. 
PREFER OZURDEX (rather than IVTA in POST VIT EYE) Prefer Avastin (NEVER Eylea)  MAY NEED OZURDEX EVERY 4-5mo OU

## 2021-09-21 ENCOUNTER — OFFICE VISIT (OUTPATIENT)
Dept: URBAN - METROPOLITAN AREA CLINIC 10 | Facility: CLINIC | Age: 66
End: 2021-09-21
Payer: COMMERCIAL

## 2021-09-21 PROCEDURE — 99212 OFFICE O/P EST SF 10 MIN: CPT | Performed by: OPTOMETRIST

## 2021-09-21 ASSESSMENT — INTRAOCULAR PRESSURE
OD: 10
OS: 10

## 2021-09-21 NOTE — IMPRESSION/PLAN
Impression: NIDDM w PDR -Rslv'd TRD s/p VIT/ peel '17 OD - 20/500--> Vit peel OS '19 - OZURDEX trial '20 to EXTEND Plan: Hx:[[OD PRP '17 -VIT/TRD Rpr Sep '17 - VIT TRD Rpr OS Feb '19. Imprv'd TRD gone. LOST f/u 9m ('19-Apr '20) - RECUR IF OFF Steroid AMA - rare CME ebb/flow -- LAST OZRDX OD: Sep '20   OS: Dec '20 -- NEVER EYLEA -- INFLAMMTION]]. DME/CME CHRONIC RECUR -- NOW improved. Inflm resolved - NOT true infx Here for IOP check. IOP normal, no steroid response. Continue care c Dr. Nick Mcqueen as scheduled.

## 2022-03-08 ENCOUNTER — OFFICE VISIT (OUTPATIENT)
Dept: URBAN - METROPOLITAN AREA CLINIC 10 | Facility: CLINIC | Age: 67
End: 2022-03-08
Payer: MEDICARE

## 2022-03-08 PROCEDURE — 92134 CPTRZ OPH DX IMG PST SGM RTA: CPT | Performed by: OPHTHALMOLOGY

## 2022-03-08 PROCEDURE — 99214 OFFICE O/P EST MOD 30 MIN: CPT | Performed by: OPHTHALMOLOGY

## 2022-03-08 ASSESSMENT — INTRAOCULAR PRESSURE
OS: 10
OD: 11

## 2022-03-08 NOTE — IMPRESSION/PLAN
Impression: NIDDM w PDR -Rslv'd TRD s/p VIT/ peel '17 OD - 20/500--> Vit peel OS '19 - OZURDEX trial '20 to EXTEND Plan: Hx:[[OD PRP '17 -VIT/TRD Rpr Sep '17 - VIT TRD Rpr OS Feb '19. Imprv'd TRD gone. LOST f/u 9m ('19-Apr '20) - RECUR IF OFF Steroid AMA - rare CME ebb/flow -- LAST OZRDX OD: Sep '20   OS: Dec '20 -- NEVER EYLEA -- INFLAMMTION - DME/CME CHRONIC RECUR -- NOW improved. Inflm resolving - NOT true infx]] TODAY DME RECUR w missed inj  -- ADD Avst / Ozurdex OD (proc note) RTC Izzy [place] IOP check / manage gen eye / Gtts 3wk. RTC RETINA KELLEY 8-10w plan Ozurdex OS . Reji Valencia .Likely FUTURE (q4m) Ozrdx OD

KEEP plan for Gen eye care MRx update w Prism PRN. PREFER OZURDEX (rather than IVTA in POST VIT EYE) Prefer Avstn (NEVER Eylea)  NEED OZURDEX EVERY 4-5mo OU      RECUR DME WORSE w missed injx '22

## 2022-05-20 ENCOUNTER — OFFICE VISIT (OUTPATIENT)
Dept: URBAN - METROPOLITAN AREA CLINIC 10 | Facility: CLINIC | Age: 67
End: 2022-05-20
Payer: COMMERCIAL

## 2022-05-20 DIAGNOSIS — E11.3513 TYPE 2 DIABETES MELLITUS WITH PROLIFERATIVE DIABETIC RETINOPATHY WITH MACULAR EDEMA, BILATERAL: Primary | ICD-10-CM

## 2022-05-20 PROCEDURE — 92134 CPTRZ OPH DX IMG PST SGM RTA: CPT | Performed by: OPHTHALMOLOGY

## 2022-05-20 PROCEDURE — 67028 INJECTION EYE DRUG: CPT | Performed by: OPHTHALMOLOGY

## 2022-05-20 PROCEDURE — 99214 OFFICE O/P EST MOD 30 MIN: CPT | Performed by: OPHTHALMOLOGY

## 2022-05-20 ASSESSMENT — INTRAOCULAR PRESSURE
OS: 10
OD: 14

## 2022-05-20 NOTE — IMPRESSION/PLAN
Impression: NIDDM w PDR -Rslv'd TRD s/p VIT/ peel '17 OD - 20/500--> Vit peel OS '19 - OZURDEX trial '20 to EXTEND Plan: Hx:[[OD PRP '17 -VIT/TRD Rpr Sep '17 - VIT TRD Rpr OS Feb '19. Imprv'd TRD gone. LOST f/u 9m ('19-Apr '20) - RECUR IF OFF Steroid AMA - rare CME ebb/flow -- LAST OZRDX OD: Sep '20   OS: Dec '20 -- NEVER EYLEA -- INFLAMMTION - DME/CME CHRONIC RECUR -- NOW improved. Inflm resolving - NOT true infx]] DME RECUR w missed inj  -- TODAY Ozurdex OS (proc note) RTC Anuj Belt IOP check / manage gen eye / Gtts 3-4wk. RTC RETINA KELLEY 12w EXAM -- PRN Ozurdex OD then OS  *(likely q4m Ozrdx) LAST OZURDEX OD Mar '22 --  OS May '22 KEEP plan for Gen eye care MRx update w Prism PRN. PREFER OZURDEX (rather than IVTA in POST VIT EYE) Prefer Avstn (NEVER Eylea)  NEED OZURDEX EVERY 4-5mo OU      RECUR DME WORSE w missed injx '22

## 2022-08-19 ENCOUNTER — OFFICE VISIT (OUTPATIENT)
Dept: URBAN - METROPOLITAN AREA CLINIC 10 | Facility: CLINIC | Age: 67
End: 2022-08-19
Payer: MEDICARE

## 2022-08-19 DIAGNOSIS — E11.3513 TYPE 2 DIABETES MELLITUS WITH PROLIFERATIVE DIABETIC RETINOPATHY WITH MACULAR EDEMA, BILATERAL: Primary | ICD-10-CM

## 2022-08-19 DIAGNOSIS — H44.003 UNSPECIFIED PURULENT ENDOPHTHALMITIS, BILATERAL: ICD-10-CM

## 2022-08-19 PROCEDURE — 92134 CPTRZ OPH DX IMG PST SGM RTA: CPT | Performed by: OPHTHALMOLOGY

## 2022-08-19 PROCEDURE — 99214 OFFICE O/P EST MOD 30 MIN: CPT | Performed by: OPHTHALMOLOGY

## 2022-08-19 PROCEDURE — 67028 INJECTION EYE DRUG: CPT | Performed by: OPHTHALMOLOGY

## 2022-08-19 ASSESSMENT — INTRAOCULAR PRESSURE
OD: 12
OS: 14

## 2022-08-19 NOTE — IMPRESSION/PLAN
Impression: NIDDM w PDR -Rslv'd TRD s/p VIT/ peel '17 OD - 20/500--> Vit peel OS '19 - OZURDEX trial '20 to EXTEND OD / OS Plan: Hx:[[OD PRP '17 -VIT/TRD Rpr Sep '17 - VIT TRD Rpr OS Feb '19. Imprv'd TRD gone. LOST f/u 9m ('19-Apr '20) - RECUR IF OFF Steroid AMA - rare CME ebb/flow -- LAST OZRDX OD: Sep '20   OS: Dec '20 -- NEVER EYLEA -- INFLAMMTION - DME/CME CHRONIC RECUR -- NOW improved. Inflm resolving - NOT true infx]] DME RECUR w missed inj  -- TODAY Ozurdex OD (proc note) RTC Lisseth Smith IOP check / manage gen eye / Gtts 3-4wk. RTC RETINA KELLEY 10-12w EXAM -- PRN Ozurdex OS then OD  *(likely q4m Ozrdx) LAST OZURDEX OD Mar '22/Aug '22  --  OS  May '22 KEEP plan for Gen eye care MRx update w Prism PRN. PREFER OZURDEX (rather than IVTA in POST VIT EYE) Prefer Avstn (NEVER Eylea)  NEED OZURDEX EVERY 4-5mo OU      RECUR DME WORSE w missed injx '22

## 2022-08-19 NOTE — IMPRESSION/PLAN
Impression: Purulent Endophthalmitis, OU. NEVER USE EYLEA again - inflamm? Plan: Continues to improve. IOP remains normotensive. Inflammation after Eylea 6 days ago. Vision improving. Reassurance. NO e/o infx. NEVER ANY MORE EYLEA
   TODAY repeated exam stable / no inflammatory cell. Prefer OZURDEX future to IVTA. RESOLVED.    See other plan

## 2022-11-01 ENCOUNTER — OFFICE VISIT (OUTPATIENT)
Dept: URBAN - METROPOLITAN AREA CLINIC 10 | Facility: CLINIC | Age: 67
End: 2022-11-01
Payer: MEDICARE

## 2022-11-01 DIAGNOSIS — E11.3513 TYPE 2 DIABETES MELLITUS WITH PROLIFERATIVE DIABETIC RETINOPATHY WITH MACULAR EDEMA, BILATERAL: Primary | ICD-10-CM

## 2022-11-01 PROCEDURE — 67028 INJECTION EYE DRUG: CPT | Performed by: OPHTHALMOLOGY

## 2022-11-01 PROCEDURE — 99214 OFFICE O/P EST MOD 30 MIN: CPT | Performed by: OPHTHALMOLOGY

## 2022-11-01 PROCEDURE — 92134 CPTRZ OPH DX IMG PST SGM RTA: CPT | Performed by: OPHTHALMOLOGY

## 2022-11-01 ASSESSMENT — INTRAOCULAR PRESSURE
OD: 12
OS: 11

## 2022-11-01 NOTE — IMPRESSION/PLAN
Impression: NIDDM w PDR -Rslv'd TRD s/p VIT/ peel '17 OD - 20/500--> Vit peel OS '19 - OZURDEX trial '20 to EXTEND OD / OS Plan: Hx:[[OD PRP '17 -VIT/TRD Rpr Sep '17 - VIT TRD Rpr OS Feb '19. Imprv'd TRD gone. LOST f/u 9m ('19-Apr '20) - RECUR IF OFF Steroid AMA - rare CME ebb/flow -- LAST OZRDX OD: Sep '20   OS: Dec '20 -- NEVER EYLEA -- INFLAMMTION - DME/CME CHRONIC RECUR -- NOW improved. Inflm resolving - NOT true infx]] DME RECUR w missed inj  --  MUST injx alternating OD, OS, OD, q 4mo TODAY Ozurdex OS (proc note) RTC Lynne Leong IOP check / manage gen eye / Gtts 3-4wk. RTC RETINA KELLEY 12-13w EXAM -- PRN Ozurdex OD then OS *(likely q4m Ozrdx) LAST OZURDEX OD Mar '22/Aug '22  --  OS  May '22, Nov '22 KEEP plan for Gen eye care MRx update w Prism PRN. PREFER OZURDEX (rather than IVTA in POST VIT EYE) Prefer Avstn (NEVER Eylea)  NEED OZURDEX EVERY 4-5mo OU      RECUR DME WORSE w missed injx '22

## 2023-02-21 ENCOUNTER — OFFICE VISIT (OUTPATIENT)
Dept: URBAN - METROPOLITAN AREA CLINIC 10 | Facility: CLINIC | Age: 68
End: 2023-02-21
Payer: MEDICARE

## 2023-02-21 DIAGNOSIS — E11.3513 TYPE 2 DIABETES MELLITUS WITH PROLIFERATIVE DIABETIC RETINOPATHY WITH MACULAR EDEMA, BILATERAL: ICD-10-CM

## 2023-02-21 DIAGNOSIS — H44.003 UNSPECIFIED PURULENT ENDOPHTHALMITIS, BILATERAL: ICD-10-CM

## 2023-02-21 DIAGNOSIS — H26.491 OTHER SECONDARY CATARACT, RIGHT EYE: Primary | ICD-10-CM

## 2023-02-21 PROCEDURE — 92134 CPTRZ OPH DX IMG PST SGM RTA: CPT | Performed by: OPTOMETRIST

## 2023-02-21 PROCEDURE — 99214 OFFICE O/P EST MOD 30 MIN: CPT | Performed by: OPTOMETRIST

## 2023-02-21 ASSESSMENT — INTRAOCULAR PRESSURE
OS: 13
OD: 15

## 2023-02-21 ASSESSMENT — VISUAL ACUITY: OS: 20/30

## 2023-02-21 NOTE — IMPRESSION/PLAN
Impression: DM PDR - Rslv'd TRD s/p VIT/ peel '17 OD -20/500--> Vit peel OS '19 - OZURDEX x '20 to EXTEND OD/OS Plan: Hx:[[OD PRP '17 -VIT/TRD Rpr Sep '17 - VIT TRD Rpr OS Feb '19. Imprv'd TRD gone. LOST f/u 9m ('19-Apr '20) - RECUR IF OFF Steroid AMA - rare CME ebb/flow -- LAST OZRDX OD: Sep '20   OS: Dec '20 -- NEVER EYLEA -- INFLAMMTION - DME/CME CHRONIC RECUR -- NOW improved. Inflm resolving - NOT true infx]] Stable. Cont. care c retina, Dr. Preethi López as scheduled. IOP stable OU.

## 2023-02-21 NOTE — IMPRESSION/PLAN
Impression: Purulent Endophthalmitis, OU. NEVER USE EYLEA again - inflamm? Plan: Continues to improve. IOP remains normotensive. Inflammation after Eylea 6 days ago. Vision improving. Reassurance. NO e/o infx. NEVER ANY MORE EYLEA Stable. Cont. care c retina.

## 2023-02-21 NOTE — IMPRESSION/PLAN
Impression: Other secondary cataract, right eye: H26.491. Plan: Discussed diagnosis in detail with patient. Discussed treatment options with patient. Discussed risks and benefits and patient understands. Significant PC haze. SPH encouraging at 20/60. Prior endophthalmitis s/p PPV will be limiting. Pt. elects YAG laser.

## 2023-03-28 ENCOUNTER — SURGERY (OUTPATIENT)
Dept: URBAN - METROPOLITAN AREA SURGERY 5 | Facility: SURGERY | Age: 68
End: 2023-03-28
Payer: MEDICARE

## 2023-03-28 PROCEDURE — 66821 AFTER CATARACT LASER SURGERY: CPT | Performed by: OPHTHALMOLOGY

## 2023-04-06 ENCOUNTER — OFFICE VISIT (OUTPATIENT)
Dept: URBAN - METROPOLITAN AREA CLINIC 10 | Facility: CLINIC | Age: 68
End: 2023-04-06
Payer: MEDICARE

## 2023-04-06 DIAGNOSIS — H04.123 DRY EYE SYNDROME OF BILATERAL LACRIMAL GLANDS: Primary | ICD-10-CM

## 2023-04-06 DIAGNOSIS — E11.3513 TYPE 2 DIABETES MELLITUS WITH PROLIFERATIVE DIABETIC RETINOPATHY WITH MACULAR EDEMA, BILATERAL: ICD-10-CM

## 2023-04-06 DIAGNOSIS — H44.003 UNSPECIFIED PURULENT ENDOPHTHALMITIS, BILATERAL: ICD-10-CM

## 2023-04-06 PROCEDURE — 99213 OFFICE O/P EST LOW 20 MIN: CPT | Performed by: STUDENT IN AN ORGANIZED HEALTH CARE EDUCATION/TRAINING PROGRAM

## 2023-04-06 ASSESSMENT — INTRAOCULAR PRESSURE
OD: 24
OS: 18

## 2023-04-06 NOTE — IMPRESSION/PLAN
Impression: Dry eye syndrome of bilateral lacrimal glands: H04.123.
-PEK OS causing irritation  Plan: Patient educated on signs and symptoms of dry eye and importance of environmental factors. Discussed using OTC AT's with patient QID OU long-term, Gel drop QHS OU, and  warm compresses BID 5-10min OU long-term. Educated patient if signs or symptoms worsen to RTC for dry eye work-up.

## 2023-04-06 NOTE — IMPRESSION/PLAN
Impression: Purulent Endophthalmitis, OU. NEVER USE EYLEA again - inflamm? Plan: Continues to improve. Inflammation after Eylea 6 days ago. Vision improving. Reassurance. NO e/o infx. NEVER ANY MORE EYLEA Stable. Cont. care c retina.

## 2023-04-06 NOTE — IMPRESSION/PLAN
Impression: DM PDR - Rslv'd TRD s/p VIT/ peel '17 OD -20/500--> Vit peel OS '19 - OZURDEX x '20 to EXTEND OD/OS Plan: Hx:[[OD PRP '17 -VIT/TRD Rpr Sep '17 - VIT TRD Rpr OS Feb '19. Imprv'd TRD gone. LOST f/u 9m ('19-Apr '20) - RECUR IF OFF Steroid AMA - rare CME ebb/flow -- LAST OZRDX OD: Sep '20   OS: Dec '20 -- NEVER EYLEA -- INFLAMMTION - DME/CME CHRONIC RECUR -- NOW improved. Inflm resolving - NOT true infx]] Stable. Cont. care c retina, Dr. Oneida Muhammad as scheduled. IOP increased slightly today continue to monitor.

## 2023-04-18 ENCOUNTER — OFFICE VISIT (OUTPATIENT)
Dept: URBAN - METROPOLITAN AREA CLINIC 10 | Facility: CLINIC | Age: 68
End: 2023-04-18
Payer: MEDICARE

## 2023-04-18 DIAGNOSIS — H44.003 UNSPECIFIED PURULENT ENDOPHTHALMITIS, BILATERAL: ICD-10-CM

## 2023-04-18 DIAGNOSIS — E11.3513 TYPE 2 DIABETES MELLITUS WITH PROLIFERATIVE DIABETIC RETINOPATHY WITH MACULAR EDEMA, BILATERAL: Primary | ICD-10-CM

## 2023-04-18 PROCEDURE — 99214 OFFICE O/P EST MOD 30 MIN: CPT | Performed by: OPHTHALMOLOGY

## 2023-04-18 PROCEDURE — 92134 CPTRZ OPH DX IMG PST SGM RTA: CPT | Performed by: OPHTHALMOLOGY

## 2023-04-18 ASSESSMENT — INTRAOCULAR PRESSURE
OD: 9
OS: 8

## 2023-04-18 NOTE — IMPRESSION/PLAN
Impression: Purulent Endophthalmitis, OU. NEVER USE EYLEA again - inflamm? Plan:   IOP remains normotensive. Inflam s/p Eylea  -- Vision improved. NO e/o infx. NEVER ANY MORE EYLEA  --   TODAY exam stable / no inflammatory cell. Prefer OZURDEX future to IVTA. RESOLVED. SEE OTHER PLAN For injx.

## 2023-04-18 NOTE — IMPRESSION/PLAN
Impression: DM PDR - Rslv'd TRD s/p VIT/ peel '17 OD -20/500--> Vit peel OS '19 - OZURDEX x '20 to EXTEND OD/OS Plan: Hx:[[OD PRP '17 -VIT/TRD Rpr Sep '17 - VIT TRD Rpr OS Feb '19. Imprv'd TRD gone. LOST f/u 9m ('19-Apr '20) - RECUR IF OFF Steroid AMA - rare CME ebb/flow -- LAST OZRDX OD: Sep '20   OS: Dec '20 -- NEVER EYLEA -- INFLAMMTION - DME/CME CHRONIC RECUR -- NOW improved. Inflm resolving - NOT true infx]] DME RECUR if miss inj  --  MUST injx alternating OD, OS, OD, q4m
    TODAY no injx . . . LIKELY need Ozurdex in 1-2mo again (OD Jan '23) RTC KELLEY 8w EXAM -- PRN - LIKELY Ozurdex OS then OD *(likely q4-5m Ozrdx) LAST OZURDEX OD Aug '22/Jan '23  --  OS  May '22 / Nov '22 KEEP plan for Gen eye care MRx update w Prism PRN. PREFER OZURDEX (rather than IVTA in POST VIT EYE) Prefer Avstn (NEVER Eylea)  NEED OZURDEX EVERY 4-5mo OU      RECUR DME WORSE w missed injx '22

## 2023-06-13 ENCOUNTER — OFFICE VISIT (OUTPATIENT)
Dept: URBAN - METROPOLITAN AREA CLINIC 10 | Facility: CLINIC | Age: 68
End: 2023-06-13
Payer: MEDICARE

## 2023-06-13 DIAGNOSIS — H44.003 UNSPECIFIED PURULENT ENDOPHTHALMITIS, BILATERAL: ICD-10-CM

## 2023-06-13 DIAGNOSIS — H40.013 OPEN ANGLE WITH BORDERLINE FINDINGS, LOW RISK, BILATERAL: ICD-10-CM

## 2023-06-13 DIAGNOSIS — E11.3513 TYPE 2 DIABETES MELLITUS WITH PROLIFERATIVE DIABETIC RETINOPATHY WITH MACULAR EDEMA, BILATERAL: Primary | ICD-10-CM

## 2023-06-13 PROCEDURE — 67028 INJECTION EYE DRUG: CPT | Performed by: OPHTHALMOLOGY

## 2023-06-13 PROCEDURE — 99214 OFFICE O/P EST MOD 30 MIN: CPT | Performed by: OPHTHALMOLOGY

## 2023-06-13 PROCEDURE — 92250 FUNDUS PHOTOGRAPHY W/I&R: CPT | Performed by: OPHTHALMOLOGY

## 2023-06-13 PROCEDURE — 92134 CPTRZ OPH DX IMG PST SGM RTA: CPT | Performed by: OPHTHALMOLOGY

## 2023-06-13 ASSESSMENT — INTRAOCULAR PRESSURE
OD: 9
OS: 9

## 2023-06-13 NOTE — IMPRESSION/PLAN
Impression: Low risk GLAUC Plan: AC tap not needed -- exam no chng  -- NERVE exam stable mod C/d w exam

## 2023-06-13 NOTE — IMPRESSION/PLAN
Impression: Purulent Endophthalmitis, OU. NEVER USE EYLEA again - inflamm? Plan: IOP remains normotensive. Inflam s/p Eylea  -- Vision improved. NO e/o infx. NEVER ANY MORE EYLEA  --   TODAY exam stable / no inflammatory cell. Prefer OZURDEX future to IVTA. RESOLVED. SEE OTHER PLAN For injx. REPEATED exam shows that there is no inflammatory cell.

## 2023-06-13 NOTE — IMPRESSION/PLAN
Impression: DM PDR - Rslv'd TRD s/p VIT/ peel '17 OD 20/500-> Vit peel OS '19 - OZURDEX x '20 to EXTEND OD/OS (pt now [facility]) Plan: Hx:[[OD PRP '17 -VIT/TRD Rpr Sep '17 - VIT TRD Rpr OS Feb '19. Imprv'd TRD gone. LOST f/u 9m ('19-Apr '20) - RECUR IF OFF Steroid AMA - rare CME ebb/flow -- LAST OZRDX OD: Sep '20   OS: Dec '20 -- NEVER EYLEA -- INFLAMMTION - DME/CME CHRONIC RECUR -- NOW improved. Inflm resolving - NOT true infx]] DME RECUR if miss inj  --  MUST injx alternate OD, OS, OD, q4-5m TODAY  Ozrdx OS Jun '23  (OD Jan '23) - talk about DM care like a [facility] KELLEY 8-10w EXAM -- PRN - (LIKELY Ozurdex OD *(likely q4-5m Ozrdx) LAST OZURDEX OD Aug '22/Jan '23  --  OS  Nov '22 / Jun '23 KEEP plan for Gen eye care MRx update w Prism PRN. PREFER OZURDEX (rather than IVTA in POST VIT EYE) Prefer Avstn (NEVER Eylea)  NEED OZURDEX EVERY 4-5mo OU      RECUR DME WORSE w missed injx '22 -- KEEP periodic OZURDEX

## 2023-08-22 ENCOUNTER — OFFICE VISIT (OUTPATIENT)
Dept: URBAN - METROPOLITAN AREA CLINIC 10 | Facility: CLINIC | Age: 68
End: 2023-08-22
Payer: MEDICARE

## 2023-08-22 DIAGNOSIS — H44.003 UNSPECIFIED PURULENT ENDOPHTHALMITIS, BILATERAL: ICD-10-CM

## 2023-08-22 DIAGNOSIS — E11.3513 TYPE 2 DIABETES MELLITUS WITH PROLIFERATIVE DIABETIC RETINOPATHY WITH MACULAR EDEMA, BILATERAL: Primary | ICD-10-CM

## 2023-08-22 DIAGNOSIS — H40.013 OPEN ANGLE WITH BORDERLINE FINDINGS, LOW RISK, BILATERAL: ICD-10-CM

## 2023-08-22 PROCEDURE — 67028 INJECTION EYE DRUG: CPT | Performed by: OPHTHALMOLOGY

## 2023-08-22 PROCEDURE — 99214 OFFICE O/P EST MOD 30 MIN: CPT | Performed by: OPHTHALMOLOGY

## 2023-08-22 PROCEDURE — 92134 CPTRZ OPH DX IMG PST SGM RTA: CPT | Performed by: OPHTHALMOLOGY

## 2023-08-22 ASSESSMENT — INTRAOCULAR PRESSURE
OS: 15
OD: 19

## 2023-12-01 ENCOUNTER — OFFICE VISIT (OUTPATIENT)
Dept: URBAN - METROPOLITAN AREA CLINIC 10 | Facility: CLINIC | Age: 68
End: 2023-12-01
Payer: MEDICARE

## 2023-12-01 DIAGNOSIS — E11.3513 TYPE 2 DIABETES MELLITUS WITH PROLIFERATIVE DIABETIC RETINOPATHY WITH MACULAR EDEMA, BILATERAL: Primary | ICD-10-CM

## 2023-12-01 PROCEDURE — 99214 OFFICE O/P EST MOD 30 MIN: CPT | Performed by: OPHTHALMOLOGY

## 2023-12-01 PROCEDURE — 92134 CPTRZ OPH DX IMG PST SGM RTA: CPT | Performed by: OPHTHALMOLOGY

## 2023-12-01 ASSESSMENT — INTRAOCULAR PRESSURE
OS: 8
OD: 7

## 2024-03-19 ENCOUNTER — OFFICE VISIT (OUTPATIENT)
Dept: URBAN - METROPOLITAN AREA CLINIC 10 | Facility: CLINIC | Age: 69
End: 2024-03-19
Payer: MEDICARE

## 2024-03-19 DIAGNOSIS — E11.3513 TYPE 2 DIABETES MELLITUS WITH PROLIFERATIVE DIABETIC RETINOPATHY WITH MACULAR EDEMA, BILATERAL: Primary | ICD-10-CM

## 2024-03-19 PROCEDURE — 99214 OFFICE O/P EST MOD 30 MIN: CPT | Performed by: OPHTHALMOLOGY

## 2024-03-19 PROCEDURE — 92250 FUNDUS PHOTOGRAPHY W/I&R: CPT | Performed by: OPHTHALMOLOGY

## 2024-03-19 PROCEDURE — 92134 CPTRZ OPH DX IMG PST SGM RTA: CPT | Performed by: OPHTHALMOLOGY

## 2024-03-19 ASSESSMENT — INTRAOCULAR PRESSURE
OD: 13
OS: 15

## 2024-06-11 ENCOUNTER — OFFICE VISIT (OUTPATIENT)
Dept: URBAN - METROPOLITAN AREA CLINIC 10 | Facility: CLINIC | Age: 69
End: 2024-06-11
Payer: MEDICARE

## 2024-06-11 DIAGNOSIS — E11.3513 TYPE 2 DIABETES MELLITUS WITH PROLIFERATIVE DIABETIC RETINOPATHY WITH MACULAR EDEMA, BILATERAL: Primary | ICD-10-CM

## 2024-06-11 PROCEDURE — 99214 OFFICE O/P EST MOD 30 MIN: CPT | Performed by: OPHTHALMOLOGY

## 2024-06-11 PROCEDURE — 67028 INJECTION EYE DRUG: CPT | Performed by: OPHTHALMOLOGY

## 2024-06-11 PROCEDURE — 92134 CPTRZ OPH DX IMG PST SGM RTA: CPT | Performed by: OPHTHALMOLOGY

## 2024-06-11 ASSESSMENT — INTRAOCULAR PRESSURE
OS: 14
OD: 13

## 2024-06-26 ENCOUNTER — OFFICE VISIT (OUTPATIENT)
Dept: URBAN - METROPOLITAN AREA CLINIC 10 | Facility: CLINIC | Age: 69
End: 2024-06-26
Payer: MEDICARE

## 2024-06-26 DIAGNOSIS — H52.223 REGULAR ASTIGMATISM, BILATERAL: ICD-10-CM

## 2024-06-26 DIAGNOSIS — E11.3513 TYPE 2 DIABETES MELLITUS WITH PROLIFERATIVE DIABETIC RETINOPATHY WITH MACULAR EDEMA, BILATERAL: Primary | ICD-10-CM

## 2024-06-26 DIAGNOSIS — H52.13 MYOPIA, BILATERAL: ICD-10-CM

## 2024-06-26 PROCEDURE — 92134 CPTRZ OPH DX IMG PST SGM RTA: CPT | Performed by: OPTOMETRIST

## 2024-06-26 PROCEDURE — 99214 OFFICE O/P EST MOD 30 MIN: CPT | Performed by: OPTOMETRIST

## 2024-06-26 ASSESSMENT — VISUAL ACUITY
OD: 20/125
OS: 20/40

## 2024-06-26 ASSESSMENT — INTRAOCULAR PRESSURE
OS: 17
OD: 13

## 2024-06-26 ASSESSMENT — KERATOMETRY
OD: 46.13
OS: 46.88

## 2024-07-16 ENCOUNTER — OFFICE VISIT (OUTPATIENT)
Dept: URBAN - METROPOLITAN AREA CLINIC 10 | Facility: CLINIC | Age: 69
End: 2024-07-16
Payer: MEDICARE

## 2024-07-16 DIAGNOSIS — E11.3513 TYPE 2 DIABETES MELLITUS WITH PROLIFERATIVE DIABETIC RETINOPATHY WITH MACULAR EDEMA, BILATERAL: Primary | ICD-10-CM

## 2024-07-16 PROCEDURE — 92134 CPTRZ OPH DX IMG PST SGM RTA: CPT | Performed by: OPHTHALMOLOGY

## 2024-07-16 PROCEDURE — 99214 OFFICE O/P EST MOD 30 MIN: CPT | Performed by: OPHTHALMOLOGY

## 2024-07-16 PROCEDURE — 67028 INJECTION EYE DRUG: CPT | Performed by: OPHTHALMOLOGY

## 2024-07-16 ASSESSMENT — INTRAOCULAR PRESSURE
OS: 13
OD: 10

## 2024-08-13 ENCOUNTER — OFFICE VISIT (OUTPATIENT)
Dept: URBAN - METROPOLITAN AREA CLINIC 10 | Facility: CLINIC | Age: 69
End: 2024-08-13
Payer: MEDICARE

## 2024-08-13 DIAGNOSIS — E11.3513 TYPE 2 DIABETES MELLITUS WITH PROLIFERATIVE DIABETIC RETINOPATHY WITH MACULAR EDEMA, BILATERAL: Primary | ICD-10-CM

## 2024-08-13 PROCEDURE — 92235 FLUORESCEIN ANGRPH MLTIFRAME: CPT | Performed by: OPHTHALMOLOGY

## 2024-08-13 PROCEDURE — 92134 CPTRZ OPH DX IMG PST SGM RTA: CPT | Performed by: OPHTHALMOLOGY

## 2024-08-13 ASSESSMENT — INTRAOCULAR PRESSURE
OS: 14
OD: 10

## 2024-09-17 ENCOUNTER — OFFICE VISIT (OUTPATIENT)
Dept: URBAN - METROPOLITAN AREA CLINIC 10 | Facility: CLINIC | Age: 69
End: 2024-09-17
Payer: MEDICARE

## 2024-09-17 DIAGNOSIS — E11.3513 TYPE 2 DIABETES MELLITUS WITH PROLIFERATIVE DIABETIC RETINOPATHY WITH MACULAR EDEMA, BILATERAL: Primary | ICD-10-CM

## 2024-09-17 PROCEDURE — 92134 CPTRZ OPH DX IMG PST SGM RTA: CPT | Performed by: OPHTHALMOLOGY

## 2024-09-17 ASSESSMENT — INTRAOCULAR PRESSURE
OD: 11
OS: 14

## 2024-10-29 ENCOUNTER — OFFICE VISIT (OUTPATIENT)
Dept: URBAN - METROPOLITAN AREA CLINIC 10 | Facility: CLINIC | Age: 69
End: 2024-10-29
Payer: MEDICARE

## 2024-10-29 DIAGNOSIS — E11.3513 TYPE 2 DIABETES MELLITUS WITH PROLIFERATIVE DIABETIC RETINOPATHY WITH MACULAR EDEMA, BILATERAL: Primary | ICD-10-CM

## 2024-10-29 DIAGNOSIS — H44.003 UNSPECIFIED PURULENT ENDOPHTHALMITIS, BILATERAL: ICD-10-CM

## 2024-10-29 PROCEDURE — 99214 OFFICE O/P EST MOD 30 MIN: CPT | Performed by: OPHTHALMOLOGY

## 2024-10-29 PROCEDURE — 92134 CPTRZ OPH DX IMG PST SGM RTA: CPT | Performed by: OPHTHALMOLOGY

## 2024-10-29 ASSESSMENT — INTRAOCULAR PRESSURE
OS: 12
OD: 12

## 2024-12-17 ENCOUNTER — OFFICE VISIT (OUTPATIENT)
Dept: URBAN - METROPOLITAN AREA CLINIC 10 | Facility: CLINIC | Age: 69
End: 2024-12-17
Payer: MEDICARE

## 2024-12-17 DIAGNOSIS — E11.3513 TYPE 2 DIABETES MELLITUS WITH PROLIFERATIVE DIABETIC RETINOPATHY WITH MACULAR EDEMA, BILATERAL: Primary | ICD-10-CM

## 2024-12-17 PROCEDURE — 92235 FLUORESCEIN ANGRPH MLTIFRAME: CPT | Performed by: OPHTHALMOLOGY

## 2024-12-17 PROCEDURE — 92134 CPTRZ OPH DX IMG PST SGM RTA: CPT | Performed by: OPHTHALMOLOGY

## 2024-12-17 ASSESSMENT — INTRAOCULAR PRESSURE
OS: 10
OD: 9

## 2025-02-04 ENCOUNTER — OFFICE VISIT (OUTPATIENT)
Dept: URBAN - METROPOLITAN AREA CLINIC 10 | Facility: CLINIC | Age: 70
End: 2025-02-04
Payer: MEDICARE

## 2025-02-04 DIAGNOSIS — E11.3513 TYPE 2 DIABETES MELLITUS WITH PROLIFERATIVE DIABETIC RETINOPATHY WITH MACULAR EDEMA, BILATERAL: Primary | ICD-10-CM

## 2025-02-04 PROCEDURE — 92134 CPTRZ OPH DX IMG PST SGM RTA: CPT | Performed by: OPHTHALMOLOGY

## 2025-02-04 ASSESSMENT — INTRAOCULAR PRESSURE
OS: 10
OD: 11

## 2025-03-18 ENCOUNTER — OFFICE VISIT (OUTPATIENT)
Dept: URBAN - METROPOLITAN AREA CLINIC 10 | Facility: CLINIC | Age: 70
End: 2025-03-18
Payer: MEDICARE

## 2025-03-18 DIAGNOSIS — H44.003 UNSPECIFIED PURULENT ENDOPHTHALMITIS, BILATERAL: ICD-10-CM

## 2025-03-18 DIAGNOSIS — E11.3513 TYPE 2 DIABETES MELLITUS WITH PROLIFERATIVE DIABETIC RETINOPATHY WITH MACULAR EDEMA, BILATERAL: Primary | ICD-10-CM

## 2025-03-18 PROCEDURE — 99214 OFFICE O/P EST MOD 30 MIN: CPT | Performed by: OPHTHALMOLOGY

## 2025-03-18 PROCEDURE — 92134 CPTRZ OPH DX IMG PST SGM RTA: CPT | Performed by: OPHTHALMOLOGY

## 2025-03-18 ASSESSMENT — INTRAOCULAR PRESSURE
OD: 10
OS: 11

## 2025-04-18 ENCOUNTER — OFFICE VISIT (OUTPATIENT)
Dept: URBAN - METROPOLITAN AREA CLINIC 10 | Facility: CLINIC | Age: 70
End: 2025-04-18
Payer: MEDICARE

## 2025-04-18 DIAGNOSIS — E11.3513 TYPE 2 DIABETES MELLITUS WITH PROLIFERATIVE DIABETIC RETINOPATHY WITH MACULAR EDEMA, BILATERAL: Primary | ICD-10-CM

## 2025-04-18 DIAGNOSIS — H44.003 UNSPECIFIED PURULENT ENDOPHTHALMITIS, BILATERAL: ICD-10-CM

## 2025-04-18 PROCEDURE — 67028 INJECTION EYE DRUG: CPT | Performed by: OPHTHALMOLOGY

## 2025-04-18 PROCEDURE — 92235 FLUORESCEIN ANGRPH MLTIFRAME: CPT | Performed by: OPHTHALMOLOGY

## 2025-04-18 PROCEDURE — 92134 CPTRZ OPH DX IMG PST SGM RTA: CPT | Performed by: OPHTHALMOLOGY

## 2025-04-18 ASSESSMENT — INTRAOCULAR PRESSURE
OS: 17
OD: 16

## 2025-06-10 ENCOUNTER — OFFICE VISIT (OUTPATIENT)
Dept: URBAN - METROPOLITAN AREA CLINIC 10 | Facility: CLINIC | Age: 70
End: 2025-06-10
Payer: MEDICARE

## 2025-06-10 DIAGNOSIS — E11.3513 TYPE 2 DIABETES MELLITUS WITH PROLIFERATIVE DIABETIC RETINOPATHY WITH MACULAR EDEMA, BILATERAL: Primary | ICD-10-CM

## 2025-06-10 PROCEDURE — 67028 INJECTION EYE DRUG: CPT | Performed by: OPHTHALMOLOGY

## 2025-06-10 PROCEDURE — 92134 CPTRZ OPH DX IMG PST SGM RTA: CPT | Performed by: OPHTHALMOLOGY

## 2025-06-10 ASSESSMENT — INTRAOCULAR PRESSURE
OD: 13
OS: 12

## 2025-07-22 ENCOUNTER — OFFICE VISIT (OUTPATIENT)
Dept: URBAN - METROPOLITAN AREA CLINIC 10 | Facility: CLINIC | Age: 70
End: 2025-07-22
Payer: MEDICARE

## 2025-07-22 DIAGNOSIS — H44.003 UNSPECIFIED PURULENT ENDOPHTHALMITIS, BILATERAL: ICD-10-CM

## 2025-07-22 PROCEDURE — 67028 INJECTION EYE DRUG: CPT | Performed by: OPHTHALMOLOGY

## 2025-07-22 PROCEDURE — 92134 CPTRZ OPH DX IMG PST SGM RTA: CPT | Performed by: OPHTHALMOLOGY

## 2025-07-22 PROCEDURE — 99214 OFFICE O/P EST MOD 30 MIN: CPT | Performed by: OPHTHALMOLOGY

## 2025-07-22 ASSESSMENT — INTRAOCULAR PRESSURE
OD: 13
OS: 15

## 2025-07-25 ENCOUNTER — OFFICE VISIT (OUTPATIENT)
Dept: URBAN - METROPOLITAN AREA CLINIC 10 | Facility: CLINIC | Age: 70
End: 2025-07-25
Payer: MEDICARE

## 2025-07-25 DIAGNOSIS — E11.3513 TYPE 2 DIABETES MELLITUS WITH PROLIFERATIVE DIABETIC RETINOPATHY WITH MACULAR EDEMA, BILATERAL: Primary | ICD-10-CM

## 2025-07-25 DIAGNOSIS — H52.4 PRESBYOPIA: ICD-10-CM

## 2025-07-25 PROCEDURE — 92014 COMPRE OPH EXAM EST PT 1/>: CPT | Performed by: STUDENT IN AN ORGANIZED HEALTH CARE EDUCATION/TRAINING PROGRAM

## 2025-07-25 PROCEDURE — 92134 CPTRZ OPH DX IMG PST SGM RTA: CPT | Performed by: STUDENT IN AN ORGANIZED HEALTH CARE EDUCATION/TRAINING PROGRAM

## 2025-07-25 ASSESSMENT — VISUAL ACUITY
OS: 20/40
OD: 20/60

## 2025-07-25 ASSESSMENT — INTRAOCULAR PRESSURE
OD: 14
OS: 15